# Patient Record
Sex: MALE | Race: WHITE | Employment: FULL TIME | URBAN - METROPOLITAN AREA
[De-identification: names, ages, dates, MRNs, and addresses within clinical notes are randomized per-mention and may not be internally consistent; named-entity substitution may affect disease eponyms.]

---

## 2023-03-16 ENCOUNTER — OFFICE VISIT (OUTPATIENT)
Dept: ORTHOPEDIC SURGERY | Age: 33
End: 2023-03-16

## 2023-03-16 DIAGNOSIS — M79.671 RIGHT FOOT PAIN: Primary | ICD-10-CM

## 2023-03-16 DIAGNOSIS — S92.811A CLOSED FRACTURE OF SESAMOID BONE OF RIGHT FOOT, INITIAL ENCOUNTER: ICD-10-CM

## 2023-03-16 DIAGNOSIS — S93.521A TURF TOE OF RIGHT FOOT: ICD-10-CM

## 2023-03-16 RX ORDER — DICLOFENAC SODIUM 75 MG/1
TABLET, DELAYED RELEASE ORAL
COMMUNITY

## 2023-03-16 RX ORDER — FEXOFENADINE HCL 180 MG/1
TABLET ORAL
COMMUNITY

## 2023-03-16 NOTE — PROGRESS NOTES
Patient was fitted and instructed on a Carbon Fiber Insert for the right foot. Patient read and signed documenting they understand and agree to [facility]'s current DME return policy.

## 2023-03-16 NOTE — PROGRESS NOTES
Name: Brett Chinchilla. YOB: 1990  Gender: male  MRN: [id number]     CC: Right foot pain    HPI:   Early December 2022: He was running with his dog pushed off and had severe right forefoot pain  Self treated but had to delay care due to insurance concerns and changing jobs  12/19/2022: Dr. Phillip Hope: Prescribed NSAID  03/16/2023: Presents for: Right foot pain    ROS/Meds/PSH/PMH/FH/SH: reviewed today    Tobacco:  reports that he has never smoked. He has never used smokeless tobacco.     Physical Examination:  Patient appears to be alert and oriented with acceptable appearance. No obvious distress or SOB  CV: appears to have acceptable vascular color and capillary refill  Neuro: appears to have mostly intact light touch sensation   Skin: First MTP thickening  MS: Standing: Plantigrade: Gait near full  Right = first MTP pain with stress testing; no gross instability; sesamoid pain more fibular  Right = full ankle/foot motor; 5/5 strength    XR: Right: Standing AP lateral oblique foot plus sesamoid view with anterior sesamoid calcification suggestive of ligament avulsion fracture; suggestion of fibular sesamoid fracture  XR Impression:  As above       Injection: No indication    Assessment:    Right foot injury: Suspected turf toe injury, sesamoid retraction, fibular sesamoid fracture    Plan:   The patient and I discussed the above assessment. We explored treatment options.      Unfortunately, I believe he had a turf toe injury, sesamoid retraction and fibular sesamoid fracture  MRI scan to further delineate whether reconstruction or sesamoidectomy as needed    Advanced medical imaging: Right foot MRI scan: Suspected turf toe injury with sesamoid retraction and fibular sesamoid fracture on plain film x-rays    DME: Carbon fiber  We discussed foot care and protection  PT: No indication today  Orthotic/prosthetic: Potential future custom full-length insole       Medication - OTC meds prn:   Surgical discussion: Discussed surgical treatment options. MRI scan will delineate. He understands my thumb debilities  Follow up: After MRI scan  Work status: Regular     This note was created using Dragon voice recognition software which may result in errors of speech and spelling recognition and word/phrase syntax errors.

## 2023-03-16 NOTE — LETTER
.You can purchase the following items online or from local stores:     [email or web address]

## 2023-03-28 ENCOUNTER — OFFICE VISIT (OUTPATIENT)
Dept: ORTHOPEDIC SURGERY | Age: 33
End: 2023-03-28

## 2023-03-28 DIAGNOSIS — M19.171 POST-TRAUMATIC OSTEOARTHRITIS OF RIGHT FOOT: ICD-10-CM

## 2023-03-28 DIAGNOSIS — S92.811S CLOSED FRACTURE OF SESAMOID BONE OF RIGHT FOOT, SEQUELA: Primary | ICD-10-CM

## 2023-03-28 RX ORDER — FLUTICASONE PROPIONATE AND SALMETEROL 250; 50 UG/1; UG/1
POWDER RESPIRATORY (INHALATION)
COMMUNITY

## 2023-03-28 NOTE — PROGRESS NOTES
Gutierrez: Full-length sesamoid PQ custom insoles; accommodative shoes  We discussed foot care and protection  PT: No indication for formal PT: Instructed on HEP Achilles stretching    Medication - OTC meds prn:   Surgical discussion: Discussed surgical treatment for:  Right fibular sesamoid resection:   He would like to hold surgery at this time  He understands my thumb debilities  Follow up: As needed  Work status: Regular     This note was created using Dragon voice recognition software which may result in errors of speech and spelling recognition and word/phrase syntax errors.

## 2023-03-28 NOTE — LETTER
You can purchase the following items online or from local stores:     [address] [email or web address]